# Patient Record
Sex: MALE | Race: WHITE | Employment: OTHER | URBAN - METROPOLITAN AREA
[De-identification: names, ages, dates, MRNs, and addresses within clinical notes are randomized per-mention and may not be internally consistent; named-entity substitution may affect disease eponyms.]

---

## 2017-03-08 ENCOUNTER — ANESTHESIA EVENT (OUTPATIENT)
Dept: SURGERY | Age: 52
End: 2017-03-08
Payer: COMMERCIAL

## 2017-03-09 ENCOUNTER — ANESTHESIA (OUTPATIENT)
Dept: SURGERY | Age: 52
End: 2017-03-09
Payer: COMMERCIAL

## 2017-03-09 ENCOUNTER — SURGERY (OUTPATIENT)
Age: 52
End: 2017-03-09

## 2017-03-09 ENCOUNTER — HOSPITAL ENCOUNTER (OUTPATIENT)
Age: 52
Setting detail: OUTPATIENT SURGERY
Discharge: HOME | End: 2017-03-09
Attending: SURGERY | Admitting: SURGERY
Payer: COMMERCIAL

## 2017-03-09 VITALS
SYSTOLIC BLOOD PRESSURE: 130 MMHG | HEART RATE: 66 BPM | OXYGEN SATURATION: 98 % | DIASTOLIC BLOOD PRESSURE: 79 MMHG | HEIGHT: 78 IN | RESPIRATION RATE: 18 BRPM | BODY MASS INDEX: 36.45 KG/M2 | WEIGHT: 315 LBS | TEMPERATURE: 97.5 F

## 2017-03-09 PROCEDURE — 74011250636 HC RX REV CODE- 250/636

## 2017-03-09 PROCEDURE — 76060000032 HC ANESTHESIA 0.5 TO 1 HR: Performed by: SURGERY

## 2017-03-09 PROCEDURE — 77030002916 HC SUT ETHLN J&J -A: Performed by: SURGERY

## 2017-03-09 PROCEDURE — 77030020782 HC GWN BAIR PAWS FLX 3M -B: Performed by: ANESTHESIOLOGY

## 2017-03-09 PROCEDURE — 88305 TISSUE EXAM BY PATHOLOGIST: CPT | Performed by: SURGERY

## 2017-03-09 PROCEDURE — 74011250637 HC RX REV CODE- 250/637: Performed by: ANESTHESIOLOGY

## 2017-03-09 PROCEDURE — 77030020143 HC AIRWY LARYN INTUB CGAS -A: Performed by: ANESTHESIOLOGY

## 2017-03-09 PROCEDURE — 76010000138 HC OR TIME 0.5 TO 1 HR: Performed by: SURGERY

## 2017-03-09 PROCEDURE — 74011000250 HC RX REV CODE- 250: Performed by: SURGERY

## 2017-03-09 PROCEDURE — 77030011640 HC PAD GRND REM COVD -A: Performed by: SURGERY

## 2017-03-09 PROCEDURE — 76210000020 HC REC RM PH II FIRST 0.5 HR: Performed by: SURGERY

## 2017-03-09 PROCEDURE — 77030033269 HC SLV COMPR SCD KNE2 CARD -B: Performed by: SURGERY

## 2017-03-09 PROCEDURE — 74011250636 HC RX REV CODE- 250/636: Performed by: SURGERY

## 2017-03-09 PROCEDURE — 74011000250 HC RX REV CODE- 250

## 2017-03-09 PROCEDURE — 76210000006 HC OR PH I REC 0.5 TO 1 HR: Performed by: SURGERY

## 2017-03-09 PROCEDURE — 77030008467 HC STPLR SKN COVD -B: Performed by: SURGERY

## 2017-03-09 PROCEDURE — 74011250636 HC RX REV CODE- 250/636: Performed by: ANESTHESIOLOGY

## 2017-03-09 PROCEDURE — 77030012411 HC DRN WND CARD -A: Performed by: SURGERY

## 2017-03-09 PROCEDURE — 77030018836 HC SOL IRR NACL ICUM -A: Performed by: SURGERY

## 2017-03-09 RX ORDER — ONDANSETRON 2 MG/ML
INJECTION INTRAMUSCULAR; INTRAVENOUS AS NEEDED
Status: DISCONTINUED | OUTPATIENT
Start: 2017-03-09 | End: 2017-03-09 | Stop reason: HOSPADM

## 2017-03-09 RX ORDER — SODIUM CHLORIDE, SODIUM LACTATE, POTASSIUM CHLORIDE, CALCIUM CHLORIDE 600; 310; 30; 20 MG/100ML; MG/100ML; MG/100ML; MG/100ML
75 INJECTION, SOLUTION INTRAVENOUS CONTINUOUS
Status: DISCONTINUED | OUTPATIENT
Start: 2017-03-09 | End: 2017-03-09 | Stop reason: HOSPADM

## 2017-03-09 RX ORDER — MIDAZOLAM HYDROCHLORIDE 1 MG/ML
2 INJECTION, SOLUTION INTRAMUSCULAR; INTRAVENOUS
Status: DISCONTINUED | OUTPATIENT
Start: 2017-03-09 | End: 2017-03-09 | Stop reason: HOSPADM

## 2017-03-09 RX ORDER — FENTANYL CITRATE 50 UG/ML
100 INJECTION, SOLUTION INTRAMUSCULAR; INTRAVENOUS ONCE
Status: DISCONTINUED | OUTPATIENT
Start: 2017-03-09 | End: 2017-03-09 | Stop reason: HOSPADM

## 2017-03-09 RX ORDER — OXYCODONE HYDROCHLORIDE 5 MG/1
5 TABLET ORAL
Status: DISCONTINUED | OUTPATIENT
Start: 2017-03-09 | End: 2017-03-09 | Stop reason: HOSPADM

## 2017-03-09 RX ORDER — HYDROCODONE BITARTRATE AND ACETAMINOPHEN 5; 325 MG/1; MG/1
2 TABLET ORAL AS NEEDED
Status: DISCONTINUED | OUTPATIENT
Start: 2017-03-09 | End: 2017-03-09 | Stop reason: HOSPADM

## 2017-03-09 RX ORDER — PROPOFOL 10 MG/ML
INJECTION, EMULSION INTRAVENOUS AS NEEDED
Status: DISCONTINUED | OUTPATIENT
Start: 2017-03-09 | End: 2017-03-09 | Stop reason: HOSPADM

## 2017-03-09 RX ORDER — BUPIVACAINE HYDROCHLORIDE 5 MG/ML
INJECTION, SOLUTION EPIDURAL; INTRACAUDAL AS NEEDED
Status: DISCONTINUED | OUTPATIENT
Start: 2017-03-09 | End: 2017-03-09 | Stop reason: HOSPADM

## 2017-03-09 RX ORDER — DEXAMETHASONE SODIUM PHOSPHATE 4 MG/ML
INJECTION, SOLUTION INTRA-ARTICULAR; INTRALESIONAL; INTRAMUSCULAR; INTRAVENOUS; SOFT TISSUE AS NEEDED
Status: DISCONTINUED | OUTPATIENT
Start: 2017-03-09 | End: 2017-03-09 | Stop reason: HOSPADM

## 2017-03-09 RX ORDER — HYDROMORPHONE HYDROCHLORIDE 2 MG/ML
0.5 INJECTION, SOLUTION INTRAMUSCULAR; INTRAVENOUS; SUBCUTANEOUS
Status: DISCONTINUED | OUTPATIENT
Start: 2017-03-09 | End: 2017-03-09 | Stop reason: HOSPADM

## 2017-03-09 RX ORDER — FAMOTIDINE 20 MG/1
20 TABLET, FILM COATED ORAL ONCE
Status: COMPLETED | OUTPATIENT
Start: 2017-03-09 | End: 2017-03-09

## 2017-03-09 RX ORDER — MIDAZOLAM HYDROCHLORIDE 1 MG/ML
5 INJECTION, SOLUTION INTRAMUSCULAR; INTRAVENOUS ONCE
Status: DISCONTINUED | OUTPATIENT
Start: 2017-03-09 | End: 2017-03-09 | Stop reason: HOSPADM

## 2017-03-09 RX ORDER — FENTANYL CITRATE 50 UG/ML
INJECTION, SOLUTION INTRAMUSCULAR; INTRAVENOUS AS NEEDED
Status: DISCONTINUED | OUTPATIENT
Start: 2017-03-09 | End: 2017-03-09 | Stop reason: HOSPADM

## 2017-03-09 RX ORDER — LIDOCAINE HYDROCHLORIDE 20 MG/ML
INJECTION, SOLUTION EPIDURAL; INFILTRATION; INTRACAUDAL; PERINEURAL AS NEEDED
Status: DISCONTINUED | OUTPATIENT
Start: 2017-03-09 | End: 2017-03-09 | Stop reason: HOSPADM

## 2017-03-09 RX ORDER — LIDOCAINE HYDROCHLORIDE 10 MG/ML
0.1 INJECTION INFILTRATION; PERINEURAL AS NEEDED
Status: DISCONTINUED | OUTPATIENT
Start: 2017-03-09 | End: 2017-03-09 | Stop reason: HOSPADM

## 2017-03-09 RX ADMIN — LIDOCAINE HYDROCHLORIDE 100 MG: 20 INJECTION, SOLUTION EPIDURAL; INFILTRATION; INTRACAUDAL; PERINEURAL at 09:17

## 2017-03-09 RX ADMIN — DEXAMETHASONE SODIUM PHOSPHATE 4 MG: 4 INJECTION, SOLUTION INTRA-ARTICULAR; INTRALESIONAL; INTRAMUSCULAR; INTRAVENOUS; SOFT TISSUE at 09:23

## 2017-03-09 RX ADMIN — PROPOFOL 20 MG: 10 INJECTION, EMULSION INTRAVENOUS at 09:28

## 2017-03-09 RX ADMIN — FENTANYL CITRATE 25 MCG: 50 INJECTION, SOLUTION INTRAMUSCULAR; INTRAVENOUS at 09:33

## 2017-03-09 RX ADMIN — PROPOFOL 30 MG: 10 INJECTION, EMULSION INTRAVENOUS at 09:27

## 2017-03-09 RX ADMIN — BUPIVACAINE HYDROCHLORIDE 30 ML: 5 INJECTION, SOLUTION EPIDURAL; INTRACAUDAL; PERINEURAL at 09:41

## 2017-03-09 RX ADMIN — CEFAZOLIN 3 G: 1 INJECTION, POWDER, FOR SOLUTION INTRAMUSCULAR; INTRAVENOUS; PARENTERAL at 09:12

## 2017-03-09 RX ADMIN — PROPOFOL 250 MG: 10 INJECTION, EMULSION INTRAVENOUS at 09:18

## 2017-03-09 RX ADMIN — ONDANSETRON 4 MG: 2 INJECTION INTRAMUSCULAR; INTRAVENOUS at 09:23

## 2017-03-09 RX ADMIN — FENTANYL CITRATE 100 MCG: 50 INJECTION, SOLUTION INTRAMUSCULAR; INTRAVENOUS at 09:15

## 2017-03-09 RX ADMIN — FENTANYL CITRATE 25 MCG: 50 INJECTION, SOLUTION INTRAMUSCULAR; INTRAVENOUS at 09:36

## 2017-03-09 RX ADMIN — FAMOTIDINE 20 MG: 20 TABLET ORAL at 08:17

## 2017-03-09 RX ADMIN — SODIUM CHLORIDE, SODIUM LACTATE, POTASSIUM CHLORIDE, AND CALCIUM CHLORIDE 75 ML/HR: 600; 310; 30; 20 INJECTION, SOLUTION INTRAVENOUS at 08:18

## 2017-03-09 RX ADMIN — HYDROCODONE BITARTRATE AND ACETAMINOPHEN 2 TABLET: 5; 325 TABLET ORAL at 10:52

## 2017-03-09 NOTE — H&P
Progress Notes  Encounter Date: 3/9/17  Esha Arriola MD   General Surgery      []Hide copied text  []Kenrickver for attribution information  aDate: 3/9/17        Name: Amando Vizcarra    MRN: [id number]   : 1965    Age: 49 y.o. Sex: male  [facility] MD [place]         CC:        Chief Complaint   Patient presents with    New Patient       lipoma on chest         HPI:     Amando Vizcarra is a 49 y.o. male who presents for evaluation of a left breast soft tissue mass as a referral from Dr. Marianne Ross of endocrinology. The area has been noticeable for two years. It has increased in size, now with pain on palpation. He would like to have it excised as it interferes with his life. No drainage. No report of infection. No trauma to the area.      PMH:          Past Medical History   Diagnosis Date    Actinic keratoses      Erectile dysfunction      GERD (gastroesophageal reflux disease)      Hyperlipidemia      Lipoma of torso      Obesity      Osteoarthritis           PSH:            Past Surgical History   Procedure Laterality Date    Hx tonsillectomy        Hx other surgical           Blood clot removal    Hx colonoscopy             MEDS:          Current Outpatient Prescriptions   Medication Sig    testosterone cypionate (DEPOTESTOTERONE CYPIONATE) 200 mg/mL injection 1 mL by IntraMUSCular route Once every 2 weeks.  Max Daily Amount: 200 mg.    Needle, Disp, 18 G (HYPODERMIC NEEDLES) 18 gauge x 1 1/2\" ndle Once every 2 weeks    Needle, Disp, 23 G (HYPODERMIC NEEDLES) 23 gauge x 1\" ndle Once every 2 weeks    raNITIdine (ZANTAC) 300 mg tablet TAKE 1 TABLET BY MOUTH AT BEDTIME    IBUPROFEN (ADVIL PO) Take by mouth as needed.      No current facility-administered medications for this visit.          ALLERGIES:      No Known Allergies     SH:          Social History   Substance Use Topics    Smoking status: Never Smoker    Smokeless tobacco: None    Alcohol use No         FH:    Family History   Problem Relation Age of Onset    Arrhythmia Father      Cancer Father         Acute Leukemia    Thyroid Disease Father      Cancer Sister         Uterine Cancer    Thyroid Disease Sister      Thyroid Disease Brother           ROS: The patient has no difficulty with chest pain or shortness of breath. No fever or chills. Comprehensive 13 point review of systems was otherwise unremarkable except as noted above.     Physical Exam:           Visit Vitals    /80    Pulse 81    Ht 6' 6\" (1.981 m)    Wt 345 lb (156.5 kg)    BMI 39.87 kg/m2         General: Alert, oriented, cooperative white male  in no acute distress. Eyes: Sclera are clear. Conjunctiva and lids within normal limits. No icterus. Ears and Nose: no gross deformities to visual inspection, gross hearing intact  Neck: Supple, trachea midline. No lymphadenopathy. Resp: Breathing is non-labored. Lungs clear to auscultation without wheezing or rhonchi   CV: RRR. No murmurs, rubs or gallops appreciated. Breast: Large, soft tissue mass in the area inferior to the left breast. No skin involvement. No signs of infection. No drainage. Tender to palpation. Abd: soft non-tender and non-distended without peritoneal signs. +bs   Psych: Mood and affect appropriate. Short-term memory and understanding intact        Assessment/Plan: Edward Hou is a 49 y.o. male who has signs and symptoms consistent with a left breast soft tissue mass.      1. Excision of a left breast soft tissue mass in the operating room.     2. I went through the risks of bleeding, infection, anesthesia, hematoma/seroma formation and possible recurrence of the lesion. I said it may be necessary to place a drain.  It may be necessary to leave the wound open, if badly infected.     Evi Harrison MD  FACS 3/9/17                        Electronically signed by Evi Harrison MD at 3/9/17        Office Visit on 3/9/17             Revision History       Detailed Report             Note shared with patient   Note Details   Author Viral Edwards MD File Time 3/9/17   Author Type Physician Status Signed   Last  Viral Edwards MD Specialty General Surgery

## 2017-03-09 NOTE — ANESTHESIA PREPROCEDURE EVALUATION
Anesthetic History   No history of anesthetic complications            Review of Systems / Medical History  Patient summary reviewed and pertinent labs reviewed    Pulmonary  Within defined limits                 Neuro/Psych   Within defined limits           Cardiovascular  Within defined limits                Exercise tolerance: >4 METS     GI/Hepatic/Renal     GERD: well controlled           Endo/Other        Morbid obesity     Other Findings            Physical Exam    Airway  Mallampati: III  TM Distance: < 4 cm  Neck ROM: normal range of motion   Mouth opening: Normal     Cardiovascular  Regular rate and rhythm,  S1 and S2 normal,  no murmur, click, rub, or gallop             Dental  No notable dental hx       Pulmonary  Breath sounds clear to auscultation               Abdominal  GI exam deferred       Other Findings            Anesthetic Plan    ASA: 3  Anesthesia type: general          Induction: Intravenous  Anesthetic plan and risks discussed with: Patient

## 2017-03-09 NOTE — DISCHARGE INSTRUCTIONS
1. Diet as tolerated except for a  low fat diet after laparoscopic cholecystectomy. 2. Showering is allowed, but no tub baths, hot tubs or swimming. 3. Drainage is common from the wounds. Change the dressings as needed. Call our office if the wounds become reddened, tender, feel warm to the touch or pus starts to drain from the wound. 4. Take prescribed pain medication as directed, usually Percocet, Norco, Ultram or Dilaudid. Take over the counter medication for minor pain. 5. Ice may be applied intermittently to the surgical site or sites. 6. Call or office, [phone], if problems arise. 7. Follow up in the office at the assigned time. 8. Resume all medications as taken per surgery, unless specifically instructed not to take certain ones. 9. No lifting more than 25 pounds until told otherwise. 10. Driving is allowed 3 days after surgery as long as you feel comfortable enough to drive and have not taken any prescription pain medication prior to driving.

## 2017-03-09 NOTE — IP AVS SNAPSHOT
[address] [address] 
[phone] Patient: Poornima Toussaint MRN: [id number] :1965 You are allergic to the following No active allergies Recent Documentation Height Weight BMI Smoking Status 1.981 m 156.2 kg 39.78 kg/m2 Never Smoker Emergency Contacts Name Discharge Info Relation Home Work Mobile Theresa Andujar  Spouse [3] [phone] About your hospitalization You were admitted on:  2017 [facility] were discharged on:  2017 Unit phone number:  [phone] Why you were hospitalized Your primary diagnosis was:  Not on File Providers Seen During Your Hospitalizations Provider Role Specialty Primary office phone Amari Mejia MD Attending Provider General Surgery [phone] Your Primary Care Physician (PCP) Primary Care Physician Office Phone Office Fax 3 [address] [phone] Follow-up Information Follow up With Details Comments Contact Info Quoc Hilario MD   [address] [phone] Amari Mejia MD Go on 3/20/2017 Monday @ 225 pm. [address] [address] 
[phone] Your Appointments 2017  2:25 PM EDT Global Post Op with Amari Mejia MD  
[facility] ([address] [place] [address]) [address] [place] [address]  
[phone]   9:00 AM EDT Follow Up with Merry Holt MD  
[facility] ENDOCRINOLOGY) [address] [facility] [address] [phone] Current Discharge Medication List  
  
CONTINUE these medications which have NOT CHANGED Dose & Instructions Dispensing Information Comments Morning Noon Evening Bedtime ADVIL PO Your next dose is: Today, Tomorrow Other:  _________ Take  by mouth as needed. Refills:  0  
     
   
   
   
  
 raNITIdine 300 mg tablet Commonly known as:  ZANTAC Your next dose is: Today, Tomorrow Other:  _________  
   
   
 prn Refills:  5  
     
   
   
   
  
 testosterone cypionate 200 mg/mL injection Commonly known as:  DEPOTESTOTERONE CYPIONATE Your next dose is: Today, Tomorrow Other:  _________ Dose:  200 mg  
1 mL by IntraMUSCular route Once every 2 weeks. Max Daily Amount: 200 mg. Quantity:  2 Vial  
Refills:  5 Discharge Instructions 1. Diet as tolerated except for a  low fat diet after laparoscopic cholecystectomy. 2. Showering is allowed, but no tub baths, hot tubs or swimming. 3. Drainage is common from the wounds. Change the dressings as needed. Call our office if the wounds become reddened, tender, feel warm to the touch or pus starts to drain from the wound. 4. Take prescribed pain medication as directed, usually Percocet, Norco, Ultram or Dilaudid. Take over the counter medication for minor pain. 5. Ice may be applied intermittently to the surgical site or sites. 6. Call or office, [phone], if problems arise. 7. Follow up in the office at the assigned time. 8. Resume all medications as taken per surgery, unless specifically instructed not to take certain ones. 9. No lifting more than 25 pounds until told otherwise. 10. Driving is allowed 3 days after surgery as long as you feel comfortable enough to drive and have not taken any prescription pain medication prior to driving. Discharge Orders None Introducing [facility] SERVICES!    
 Jia Bond introduces [employer] patient portal. Now you can access parts of your medical record, email your doctor's office, and request medication refills online. 1. In your internet browser, go to [email or web address]"[email or web address]. [email or web address]/"[employer] 2. Click on the First Time User? Click Here link in the Sign In box. You will see the New Member Sign Up page. 3. Enter your [employer] Access Code exactly as it appears below. You will not need to use this code after youve completed the sign-up process. If you do not sign up before the expiration date, you must request a new code. · [employer] Access Code: [email or web address] Expires: 5/28/2017  1:48 PM 
 
4. Enter the last four digits of your Social Security Number (xxxx) and Date of Birth (mm/dd/yyyy) as indicated and click Submit. You will be taken to the next sign-up page. 5. Create a [employer] ID. This will be your [employer] login ID and cannot be changed, so think of one that is secure and easy to remember. 6. Create a [employer] password. You can change your password at any time. 7. Enter your Password Reset Question and Answer. This can be used at a later time if you forget your password. 8. Enter your e-mail address. You will receive e-mail notification when new information is available in [address] 9. Click Sign Up. You can now view and download portions of your medical record. 10. Click the Download Summary menu link to download a portable copy of your medical information. If you have questions, please visit the Frequently Asked Questions section of the [employer] website. Remember, [employer] is NOT to be used for urgent needs. For medical emergencies, dial 911. Now available from your iPhone and Android! General Information Please provide this summary of care documentation to your next provider. Patient Signature:  ____________________________________________________________ Date:  ____________________________________________________________  
  
Orb Sanchez  Provider Signature: ____________________________________________________________ Date:  ____________________________________________________________

## 2017-03-09 NOTE — BRIEF OP NOTE
BRIEF OPERATIVE NOTE    Date of Procedure: 3/9/2017   Preoperative Diagnosis: LEFT ANTERIOR CHEST WALL MASS  Postoperative Diagnosis: SAME, PROBABLE LIPOMA  Procedure(s):EXCISION LEFT ANTERIOR CHEST WALL SOFT TISSUE MASS MEASURING 15 CM X 10 CM X 5 CM  Surgeon(s) and Role:     * Luis Marley MD - Primary            Surgical Staff:  Circ-1: Katelyn Martinez RN  Scrub Tech-1: Jayshree Ta  Scrub Tech-2: Max Stanford  Event Time In   Incision Start 4423   Incision Close 7915     Anesthesia: General plus local  Estimated Blood Loss: 10 cc's  Specimens:   ID Type Source Tests Collected by Time Destination   1 : left anterior chest wall mass Fresh Chest  Luis Marley MD 3/9/2017 5029 Pathology      Findings: Probable lipoma  Complications: None  Implants: * No implants in log *

## 2017-03-09 NOTE — ANESTHESIA POSTPROCEDURE EVALUATION
Post-Anesthesia Evaluation and Assessment    Patient: Susan Tate MRN: [id number]  SSN: [id number]    YOB: 1965  Age: 49 y.o. Sex: male       Cardiovascular Function/Vital Signs  Visit Vitals    /76    Pulse 64    Temp 36.8 °C (98.2 °F)    Resp 14    Ht 6' 6\" (1.981 m)    Wt 156.2 kg (344 lb 4 oz)    SpO2 97%    BMI 39.78 kg/m2       Patient is status post general anesthesia for Procedure(s):  EXCISION MASS LEFT CHEST WALL. Nausea/Vomiting: None    Postoperative hydration reviewed and adequate. Pain:  Pain Scale 1: Visual (03/09/17 1002)  Pain Intensity 1: 0 (03/09/17 1002)   Managed    Neurological Status:   Neuro (WDL): Within Defined Limits (03/09/17 1002)   At baseline    Mental Status and Level of Consciousness: Arousable    Pulmonary Status:   O2 Device: Nasal cannula (03/09/17 1002)   Adequate oxygenation and airway patent    Complications related to anesthesia: None    Post-anesthesia assessment completed.  No concerns    Signed By: Gabi Nuñez MD     March 9, 2017

## 2017-03-09 NOTE — OP NOTES
[facility] 66   OPERATIVE REPORT       Name:  Alberto Cavazos   MR#:  [id number]   :  1965   Account #:  [de-identified]   Date of Adm:  2017       DATE OF PROCEDURE: 2017     PREOPERATIVE DIAGNOSIS: Left anterior chest wall soft tissue   mass. POSTOPERATIVE DIAGNOSIS: Left anterior chest wall soft tissue   mass. PROCEDURE: Excision of left anterior chest wall soft tissue mass   measuring 15 cm x 10 cm x 5 cm. SURGEON: Michael Haines MD    ANESTHESIA: General anesthesia via an LMA plus 300 mL of 0.5%   Marcaine used as local anesthesia at the end of the procedure. ESTIMATED BLOOD LOSS: 10 mL. SPECIMENS: Left anterior chest wall soft tissue mass, probable   lipoma. HISTORY: This is a 62-year-old male who I was asked to see by   Dr. Leonard De León in [facility]. The patient had an enlarging soft   tissue mass, anterior chest wall, in the left upper quadrant,   just inferior to the left breast. Mass was increasing in size. It was painful at times, particularly when pressed or laid upon. The patient wished to have it excised. It was scheduled for   today. I went through the risks of bleeding, infection,   anesthesia, hematoma, seroma formation, potential wound   infection. I said it was likely we would leave a drain, as the   mass was quite large and fluid would accumulate in the dead   space. The patient was agreeable, signed a consent form. The patient was seen in the preoperative area, where the area   was marked. He is morbidly obese, being 6 feet 6 inches, 345   pounds, and a BMI of 40. DESCRIPTION OF PROCEDURE: The area was marked out with his   permission. He was then transported to room #3 at [address] [place] [address], where general anesthesia via an LMA was   administered by Dr. Lula Gilliam and Karyn Boswell. A time-out was   done identifying the patient, surgeon, procedure, and his   birthday of 1965.  Once everyone in the room agreed with   the time-out, I began the procedure. The 15 scalpel blade was   used to make a transverse incision overlying a palpable soft   tissue mass in the left anterior chest wall, inferior to the   left breast. The incision was carried to the skin and soft   tissue. Electrocautery was used and blunt dissection was used to   further dissect out a soft tissue mass, which appeared to be a   large lipoma. The mass of tissue excised was 15 cm x 10 cm x 5   cm. The lesion was excised, sent to Pathology for evaluation. Wound was irrigated. Hemostasis was achieved with   electrocautery. Flaps were raised circumferentially. The deep   tissue was closed with interrupted sutures of 0 Vicryl. The skin   was closed with a combination of vertical mattress sutures of 2-  0 nylon and surgical staples. I did insert a 1/4-inch Penrose   drain into the wound to drain any fluid that may accumulate. It   was sutured to the skin with a 2-0 nylon on the lateral aspect   of the wound. I injected 30 mL of 0.5% Marcaine in divided doses   around the incision site. The patient tolerated the procedure   well, was extubated, brought to the recovery room in stable   condition.         Candice Marley MD      [facility] / [facility]   D:  03/09/2017   09:59   T:  03/09/2017   14:16   Job #:  [id number]

## 2017-03-09 NOTE — INTERVAL H&P NOTE
H&P Update:  Caryle Plover was seen and examined. History and physical has been reviewed. The patient has been examined.  There have been no significant clinical changes since the completion of the originally dated History and Physical.    Signed By: Lowell Brooks MD     March 9, 2017 8:06 AM

## 2018-03-29 PROBLEM — D75.1 POLYCYTHEMIA: Status: ACTIVE | Noted: 2018-03-29

## 2019-05-01 ENCOUNTER — HOSPITAL ENCOUNTER (OUTPATIENT)
Dept: RESPIRATORY THERAPY | Age: 54
Discharge: HOME | End: 2019-05-01
Attending: INTERNAL MEDICINE
Payer: COMMERCIAL

## 2019-05-01 DIAGNOSIS — Z77.090 EXPOSURE TO ASBESTOS: ICD-10-CM

## 2019-05-01 DIAGNOSIS — R05.3 CHRONIC COUGH: ICD-10-CM

## 2019-05-01 PROCEDURE — 94726 PLETHYSMOGRAPHY LUNG VOLUMES: CPT

## 2019-05-01 PROCEDURE — 94060 EVALUATION OF WHEEZING: CPT

## 2019-05-01 PROCEDURE — 94729 DIFFUSING CAPACITY: CPT

## 2019-05-03 NOTE — PROCEDURES
[facility] NOTE    Name:  Jailyn Bee  MR#:  [id number]  :  1965  ACCOUNT #:  [de-identified]  DATE OF SERVICE:  2019    COMPLETE PULMONARY FUNCTION TEST      FINDINGS:  1.   Spirometry and flow volume loops are normal.  2.  Lung volumes and diffusion capacity are normal.    IMPRESSION:  This is a normal complete pulmonary function test.        Elizabeth Walker MD      [id number]/  D:  2019 16:49  T:  2019 3:53  JOB #:  [id number]

## 2019-05-08 ENCOUNTER — HOSPITAL ENCOUNTER (OUTPATIENT)
Dept: CT IMAGING | Age: 54
Discharge: HOME | End: 2019-05-08
Attending: INTERNAL MEDICINE
Payer: COMMERCIAL

## 2019-05-08 VITALS — HEIGHT: 78 IN | BODY MASS INDEX: 36.45 KG/M2 | WEIGHT: 315 LBS

## 2019-05-08 DIAGNOSIS — R05.3 CHRONIC COUGH: ICD-10-CM

## 2019-05-08 DIAGNOSIS — Z77.090 EXPOSURE TO ASBESTOS: ICD-10-CM

## 2019-05-08 PROCEDURE — 74011636320 HC RX REV CODE- 636/320: Performed by: INTERNAL MEDICINE

## 2019-05-08 PROCEDURE — 71260 CT THORAX DX C+: CPT

## 2019-05-08 PROCEDURE — 74011000258 HC RX REV CODE- 258: Performed by: INTERNAL MEDICINE

## 2019-05-08 RX ORDER — SODIUM CHLORIDE 0.9 % (FLUSH) 0.9 %
10 SYRINGE (ML) INJECTION
Status: COMPLETED | OUTPATIENT
Start: 2019-05-08 | End: 2019-05-08

## 2019-05-08 RX ADMIN — IOPAMIDOL 80 ML: 755 INJECTION, SOLUTION INTRAVENOUS at 08:10

## 2019-05-08 RX ADMIN — Medication 10 ML: at 08:10

## 2019-05-08 RX ADMIN — SODIUM CHLORIDE 100 ML: 900 INJECTION, SOLUTION INTRAVENOUS at 08:10

## 2022-03-19 PROBLEM — D75.1 POLYCYTHEMIA: Status: ACTIVE | Noted: 2018-03-29

## 2022-05-27 DIAGNOSIS — D75.1 POLYCYTHEMIA: Primary | ICD-10-CM

## 2022-05-27 DIAGNOSIS — E23.0 HYPOGONADOTROPIC HYPOGONADISM IN MALE (HCC): ICD-10-CM

## 2022-09-06 DIAGNOSIS — E23.0 HYPOPITUITARISM (HCC): ICD-10-CM

## 2022-09-06 RX ORDER — TESTOSTERONE CYPIONATE 200 MG/ML
INJECTION INTRAMUSCULAR
Qty: 2 ML | OUTPATIENT
Start: 2022-09-06

## 2022-10-05 ENCOUNTER — TELEPHONE (OUTPATIENT)
Dept: ENDOCRINOLOGY | Age: 57
End: 2022-10-05

## 2022-10-05 DIAGNOSIS — E23.0 HYPOGONADOTROPIC HYPOGONADISM IN MALE (HCC): Primary | ICD-10-CM

## 2022-10-05 RX ORDER — TESTOSTERONE CYPIONATE 200 MG/ML
80 INJECTION INTRAMUSCULAR
Qty: 2 ML | Refills: 5 | Status: SHIPPED | OUTPATIENT
Start: 2022-10-05 | End: 2023-10-05

## 2022-10-05 NOTE — TELEPHONE ENCOUNTER
Patient's wife called about his testosterone refill. She said she called twice last week and he is completely out. She would like it called in before the weekend with a confirmation called once it's complete.

## 2023-07-27 ENCOUNTER — TELEPHONE (OUTPATIENT)
Dept: ENDOCRINOLOGY | Age: 58
End: 2023-07-27

## 2023-07-27 DIAGNOSIS — E23.0 HYPOGONADOTROPIC HYPOGONADISM IN MALE (HCC): ICD-10-CM

## 2023-07-27 RX ORDER — TESTOSTERONE CYPIONATE 200 MG/ML
INJECTION, SOLUTION INTRAMUSCULAR
Qty: 2 ML | Refills: 0 | Status: SHIPPED | OUTPATIENT
Start: 2023-07-27 | End: 2024-07-27

## 2023-07-27 NOTE — TELEPHONE ENCOUNTER
Patients wife came in stating that the patient needs a refill on his testosterone. I called the pharmacy to make sure he is out of his testosterone and he is.

## 2023-07-27 NOTE — TELEPHONE ENCOUNTER
Patients wife is not on the BRAN. The patient and the patients wife were on a 3 way call with me discussing that His testosterone needs a PA. I explained that that is okay and that it could take up to a week and that then the insurance will review it. The patients wife seemed very admit that she would like for it to be done sooner if it can be and expedited.  I will add to the PA list.

## 2023-08-01 NOTE — TELEPHONE ENCOUNTER
I tried to start PA today but realized there is no insurance on file for patient. I called and LVM letting him know to call back with this information.

## 2023-08-08 DIAGNOSIS — E23.0 HYPOGONADOTROPIC HYPOGONADISM IN MALE (HCC): Primary | ICD-10-CM

## 2023-08-08 DIAGNOSIS — D75.1 POLYCYTHEMIA: ICD-10-CM

## 2023-08-08 NOTE — TELEPHONE ENCOUNTER
PA done today via covermymeds. NGO: [employer] Ngo: [id number] - PA Case ID: [id number]  Outcome  Approvedtoday  Request Reference Number: [id number]. TESTOST CYP INJ 200MG/ML is approved through 08/08/2024. Your patient may now fill this prescription and it will be covered. LVM letting patient know this at 5:05pm on 8/8/2023. Called pharmacist who states rx did go through insurance, they would get it ready for patient.

## 2023-08-08 NOTE — TELEPHONE ENCOUNTER
Patients wife called in regards to patients prior authorization for testosterone shots. I told her in order for a prior authorization to be done, we have to have a physical copy scanned into the patients chart and we did not have that. Patient was frustrated and sent insurance card via email. Uploaded insurance in patients chart for prior authorization to be completed.

## 2023-08-17 ENCOUNTER — OFFICE VISIT (OUTPATIENT)
Dept: ENDOCRINOLOGY | Age: 58
End: 2023-08-17
Payer: COMMERCIAL

## 2023-08-17 VITALS
HEART RATE: 59 BPM | OXYGEN SATURATION: 95 % | BODY MASS INDEX: 35.48 KG/M2 | SYSTOLIC BLOOD PRESSURE: 112 MMHG | WEIGHT: 307 LBS | DIASTOLIC BLOOD PRESSURE: 80 MMHG

## 2023-08-17 DIAGNOSIS — D75.1 POLYCYTHEMIA: ICD-10-CM

## 2023-08-17 DIAGNOSIS — E23.0 HYPOGONADOTROPIC HYPOGONADISM IN MALE (HCC): Primary | ICD-10-CM

## 2023-08-17 DIAGNOSIS — E04.2 MULTINODULAR GOITER: ICD-10-CM

## 2023-08-17 PROCEDURE — 99214 OFFICE O/P EST MOD 30 MIN: CPT | Performed by: INTERNAL MEDICINE

## 2023-08-17 RX ORDER — ROSUVASTATIN CALCIUM 10 MG/1
10 TABLET, COATED ORAL DAILY
COMMUNITY
Start: 2023-07-21

## 2023-08-17 RX ORDER — TESTOSTERONE CYPIONATE 200 MG/ML
80 INJECTION, SOLUTION INTRAMUSCULAR WEEKLY
Qty: 4 ML | Refills: 5 | Status: SHIPPED | OUTPATIENT
Start: 2023-08-17 | End: 2024-08-17

## 2023-08-17 ASSESSMENT — ENCOUNTER SYMPTOMS: SHORTNESS OF BREATH: 0

## 2023-08-17 NOTE — PROGRESS NOTES
cervical adenopathy. Neurological:      Motor: No tremor. Orders Placed This Encounter   Procedures    Testosterone Total Only, Male     Standing Status:   Future     Standing Expiration Date:   8/17/2025    CBC     Standing Status:   Future     Standing Expiration Date:   8/17/2025       Current Outpatient Medications   Medication Sig Dispense Refill    rosuvastatin (CRESTOR) 10 MG tablet Take 1 tablet by mouth daily      testosterone cypionate (DEPOTESTOTERONE CYPIONATE) 200 MG/ML injection Inject 0.4 mLs into the muscle once a week. Max Daily Amount: 80 mg 4 mL 5    ZINC PO Take by mouth      albuterol sulfate  (90 Base) MCG/ACT inhaler INHALE 2 PUFFS BY MOUTH EVERY 4 TO 6 HOURS AS NEEDED      fluticasone (FLONASE) 50 MCG/ACT nasal spray 2 sprays by Nasal route daily      metFORMIN (GLUCOPHAGE) 500 MG tablet Take 1 tablet by mouth 2 times daily      omeprazole (PRILOSEC) 20 MG delayed release capsule Take 1 capsule by mouth daily       No current facility-administered medications for this visit.

## (undated) DEVICE — DRAPE TWL SURG 16X26IN BLU ORB04] ALLCARE INC]

## (undated) DEVICE — PENROSE DRAIN 12" X 1/4: Brand: CARDINAL HEALTH

## (undated) DEVICE — BUTTON SWITCH PENCIL BLADE ELECTRODE, HOLSTER: Brand: EDGE

## (undated) DEVICE — SURGICAL PROCEDURE PACK BASIC ST FRANCIS

## (undated) DEVICE — 2000CC GUARDIAN II: Brand: GUARDIAN

## (undated) DEVICE — REM POLYHESIVE ADULT PATIENT RETURN ELECTRODE: Brand: VALLEYLAB

## (undated) DEVICE — SYR 10ML CTRL LR LCK NSAF LF --

## (undated) DEVICE — KENDALL SCD EXPRESS SLEEVES, KNEE LENGTH, LARGE: Brand: KENDALL SCD

## (undated) DEVICE — DRAPE,TOP,102X53,STERILE: Brand: MEDLINE

## (undated) DEVICE — SHEET, DRAPE, SPLIT, STERILE: Brand: MEDLINE

## (undated) DEVICE — (D)PREP SKN CHLRAPRP APPL 26ML -- CONVERT TO ITEM 371833

## (undated) DEVICE — NEEDLE HYPO 21GA L1.5IN GRN POLYPR HUB S STL THN WALL IV

## (undated) DEVICE — SUT ETHLN 3-0 18IN PS2 BLK --

## (undated) DEVICE — SOLUTION IV 1000ML 0.9% SOD CHL

## (undated) DEVICE — SLIM BODY SKIN STAPLER: Brand: APPOSE ULC